# Patient Record
Sex: MALE | Race: WHITE | URBAN - NONMETROPOLITAN AREA
[De-identification: names, ages, dates, MRNs, and addresses within clinical notes are randomized per-mention and may not be internally consistent; named-entity substitution may affect disease eponyms.]

---

## 2017-12-12 ENCOUNTER — OFFICE VISIT (OUTPATIENT)
Dept: FAMILY MEDICINE CLINIC | Age: 42
End: 2017-12-12

## 2017-12-12 ENCOUNTER — HOSPITAL ENCOUNTER (OUTPATIENT)
Dept: OTHER | Age: 42
Discharge: HOME | End: 2017-12-12
Attending: FAMILY MEDICINE | Admitting: FAMILY MEDICINE

## 2017-12-12 VITALS
TEMPERATURE: 99.3 F | OXYGEN SATURATION: 98 % | HEART RATE: 102 BPM | SYSTOLIC BLOOD PRESSURE: 146 MMHG | DIASTOLIC BLOOD PRESSURE: 96 MMHG | RESPIRATION RATE: 20 BRPM | BODY MASS INDEX: 29.03 KG/M2 | WEIGHT: 220 LBS

## 2017-12-12 DIAGNOSIS — M54.16 ACUTE RIGHT LUMBAR RADICULOPATHY: ICD-10-CM

## 2017-12-12 DIAGNOSIS — M54.16 ACUTE RIGHT LUMBAR RADICULOPATHY: Primary | ICD-10-CM

## 2017-12-12 PROCEDURE — G8484 FLU IMMUNIZE NO ADMIN: HCPCS | Performed by: FAMILY MEDICINE

## 2017-12-12 PROCEDURE — 4004F PT TOBACCO SCREEN RCVD TLK: CPT | Performed by: FAMILY MEDICINE

## 2017-12-12 PROCEDURE — G8427 DOCREV CUR MEDS BY ELIG CLIN: HCPCS | Performed by: FAMILY MEDICINE

## 2017-12-12 PROCEDURE — 99214 OFFICE O/P EST MOD 30 MIN: CPT | Performed by: FAMILY MEDICINE

## 2017-12-12 PROCEDURE — G8419 CALC BMI OUT NRM PARAM NOF/U: HCPCS | Performed by: FAMILY MEDICINE

## 2017-12-12 RX ORDER — METHYLPREDNISOLONE 4 MG/1
4 TABLET ORAL
COMMUNITY
Start: 2017-12-08

## 2017-12-12 RX ORDER — HYDROCODONE BITARTRATE AND ACETAMINOPHEN 5; 325 MG/1; MG/1
5-325 TABLET ORAL EVERY 6 HOURS PRN
COMMUNITY
Start: 2017-12-08

## 2017-12-12 RX ORDER — NAPROXEN 500 MG/1
500 TABLET ORAL
COMMUNITY
Start: 2017-12-08

## 2017-12-12 ASSESSMENT — ENCOUNTER SYMPTOMS
BACK PAIN: 1
GASTROINTESTINAL NEGATIVE: 1

## 2017-12-12 NOTE — PROGRESS NOTES
Well-nourished well-developed young white male quite anxious about his back and leg problem   HENT:   Head: Normocephalic. Eyes: No scleral icterus. Musculoskeletal: He exhibits no edema. Lumbar back: He exhibits tenderness (when he extends his back, he has pain in his back and down his right leg), pain and spasm. He exhibits normal range of motion and normal pulse. Lymphadenopathy:        Right: No inguinal adenopathy present. Left: No inguinal adenopathy present. Neurological: He is alert and oriented to person, place, and time. He has normal strength. Gait normal.   Reflex Scores:       Patellar reflexes are 2+ on the right side and 2+ on the left side. Achilles reflexes are 0 on the right side and 2+ on the left side. SLR-/-    Actually no weaknessable to walk on toes and heels   Skin: No rash noted. No pallor. Psychiatric: His speech is normal and behavior is normal. His mood appears anxious. Nursing note and vitals reviewed. BP (!) 146/96   Pulse 102   Temp 99.3 °F (37.4 °C) (Oral)   Resp 20   Wt 220 lb (99.8 kg)   SpO2 98%   BMI 29.03 kg/m²     Assessment:      Rj Gan was seen today for leg pain, letter for school/work and foot pain. Diagnoses and all orders for this visit:    Acute right lumbar radiculopathy  -     XR LUMBAR SPINE (2-3 VIEWS)           Plan:      .[email or web address]  Return if symptoms worsen or fail to improve. Patient Instructions   F/u depending on test results     Will probably need an mri    Please call if increased radiating pain in legs or bowel/bladder dysfunction.

## 2017-12-13 ENCOUNTER — TELEPHONE (OUTPATIENT)
Dept: FAMILY MEDICINE CLINIC | Age: 42
End: 2017-12-13

## 2017-12-13 DIAGNOSIS — M54.41 RIGHT-SIDED LOW BACK PAIN WITH RIGHT-SIDED SCIATICA, UNSPECIFIED CHRONICITY: Primary | ICD-10-CM

## 2017-12-13 NOTE — TELEPHONE ENCOUNTER
Patient informed, letter is made up. Patient is calling back with his wifes fax number so we can fax it to her.

## 2017-12-20 ENCOUNTER — TELEPHONE (OUTPATIENT)
Dept: FAMILY MEDICINE CLINIC | Age: 42
End: 2017-12-20

## 2017-12-20 NOTE — TELEPHONE ENCOUNTER
I spoke to pt he advised that this is now scheduled with a workman's comp physician on 12/22/17 Dr. Ashok Monk. Pt stated he was told to cancel out MRI.